# Patient Record
Sex: MALE | Race: WHITE | NOT HISPANIC OR LATINO | URBAN - METROPOLITAN AREA
[De-identification: names, ages, dates, MRNs, and addresses within clinical notes are randomized per-mention and may not be internally consistent; named-entity substitution may affect disease eponyms.]

---

## 2022-01-05 ENCOUNTER — APPOINTMENT (OUTPATIENT)
Dept: CT IMAGING | Facility: HOSPITAL | Age: 22
End: 2022-01-05

## 2022-01-05 ENCOUNTER — HOSPITAL ENCOUNTER (EMERGENCY)
Facility: HOSPITAL | Age: 22
Discharge: HOME | End: 2022-01-05
Admitting: EMERGENCY MEDICINE

## 2022-01-05 VITALS
BODY MASS INDEX: 18.24 KG/M2 | TEMPERATURE: 98 F | HEART RATE: 72 BPM | WEIGHT: 134.7 LBS | DIASTOLIC BLOOD PRESSURE: 65 MMHG | OXYGEN SATURATION: 100 % | RESPIRATION RATE: 16 BRPM | SYSTOLIC BLOOD PRESSURE: 112 MMHG | HEIGHT: 72 IN

## 2022-01-05 DIAGNOSIS — R07.9 CHEST PAIN, UNSPECIFIED TYPE: ICD-10-CM

## 2022-01-05 DIAGNOSIS — M54.9 ACUTE BACK PAIN, UNSPECIFIED BACK LOCATION, UNSPECIFIED BACK PAIN LATERALITY: ICD-10-CM
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
## 2022-01-05 DIAGNOSIS — R10.84 ABDOMINAL PAIN, GENERALIZED: ICD-10-CM

## 2022-01-05 DIAGNOSIS — R31.9 HEMATURIA, UNSPECIFIED TYPE: ICD-10-CM

## 2022-01-05 DIAGNOSIS — S16.1XXA STRAIN OF NECK MUSCLE, INITIAL ENCOUNTER: ICD-10-CM

## 2022-01-05 LAB
ALBUMIN SERPL-MCNC: 4.6 G/DL (ref 3.5–5.2)
ALBUMIN/GLOB SERPL: 1.8 G/DL
ALP SERPL-CCNC: 58 U/L (ref 39–117)
ALT SERPL W P-5'-P-CCNC: 13 U/L (ref 1–41)
ANION GAP SERPL CALCULATED.3IONS-SCNC: 12 MMOL/L (ref 5–15)
AST SERPL-CCNC: 19 U/L (ref 1–40)
BACTERIA UR QL AUTO: ABNORMAL /HPF
BASOPHILS # BLD AUTO: 0 10*3/MM3 (ref 0–0.2)
BASOPHILS NFR BLD AUTO: 0.6 % (ref 0–1.5)
BILIRUB SERPL-MCNC: 0.7 MG/DL (ref 0–1.2)
BILIRUB UR QL STRIP: NEGATIVE
BUN SERPL-MCNC: 8 MG/DL (ref 6–20)
BUN/CREAT SERPL: 9.4 (ref 7–25)
CALCIUM SPEC-SCNC: 9.6 MG/DL (ref 8.6–10.5)
CHLORIDE SERPL-SCNC: 102 MMOL/L (ref 98–107)
CLARITY UR: CLEAR
CO2 SERPL-SCNC: 26 MMOL/L (ref 22–29)
COLOR UR: YELLOW
CREAT SERPL-MCNC: 0.85 MG/DL (ref 0.76–1.27)
DEPRECATED RDW RBC AUTO: 39.4 FL (ref 37–54)
EOSINOPHIL # BLD AUTO: 0 10*3/MM3 (ref 0–0.4)
EOSINOPHIL NFR BLD AUTO: 0.2 % (ref 0.3–6.2)
ERYTHROCYTE [DISTWIDTH] IN BLOOD BY AUTOMATED COUNT: 12.8 % (ref 12.3–15.4)
GFR SERPL CREATININE-BSD FRML MDRD: 116 ML/MIN/1.73
GLOBULIN UR ELPH-MCNC: 2.6 GM/DL
GLUCOSE SERPL-MCNC: 106 MG/DL (ref 65–99)
GLUCOSE UR STRIP-MCNC: NEGATIVE MG/DL
HCT VFR BLD AUTO: 40.8 % (ref 37.5–51)
HGB BLD-MCNC: 14.4 G/DL (ref 13–17.7)
HGB UR QL STRIP.AUTO: ABNORMAL
HYALINE CASTS UR QL AUTO: ABNORMAL /LPF
KETONES UR QL STRIP: NEGATIVE
LEUKOCYTE ESTERASE UR QL STRIP.AUTO: NEGATIVE
LIPASE SERPL-CCNC: 36 U/L (ref 13–60)
LYMPHOCYTES # BLD AUTO: 1.3 10*3/MM3 (ref 0.7–3.1)
LYMPHOCYTES NFR BLD AUTO: 15.8 % (ref 19.6–45.3)
MCH RBC QN AUTO: 30.3 PG (ref 26.6–33)
MCHC RBC AUTO-ENTMCNC: 35.4 G/DL (ref 31.5–35.7)
MCV RBC AUTO: 85.6 FL (ref 79–97)
MONOCYTES # BLD AUTO: 0.5 10*3/MM3 (ref 0.1–0.9)
MONOCYTES NFR BLD AUTO: 5.6 % (ref 5–12)
NEUTROPHILS NFR BLD AUTO: 6.5 10*3/MM3 (ref 1.7–7)
NEUTROPHILS NFR BLD AUTO: 77.8 % (ref 42.7–76)
NITRITE UR QL STRIP: NEGATIVE
NRBC BLD AUTO-RTO: 0 /100 WBC (ref 0–0.2)
PH UR STRIP.AUTO: 6.5 [PH] (ref 5–8)
PLATELET # BLD AUTO: 327 10*3/MM3 (ref 140–450)
PMV BLD AUTO: 6.9 FL (ref 6–12)
POTASSIUM SERPL-SCNC: 3.5 MMOL/L (ref 3.5–5.2)
PROT SERPL-MCNC: 7.2 G/DL (ref 6–8.5)
PROT UR QL STRIP: NEGATIVE
RBC # BLD AUTO: 4.77 10*6/MM3 (ref 4.14–5.8)
RBC # UR STRIP: ABNORMAL /HPF
REF LAB TEST METHOD: ABNORMAL
SODIUM SERPL-SCNC: 140 MMOL/L (ref 136–145)
SP GR UR STRIP: 1.01 (ref 1–1.03)
SQUAMOUS #/AREA URNS HPF: ABNORMAL /HPF
UROBILINOGEN UR QL STRIP: ABNORMAL
WBC # UR STRIP: ABNORMAL /HPF
WBC NRBC COR # BLD: 8.4 10*3/MM3 (ref 3.4–10.8)

## 2022-01-05 PROCEDURE — 81001 URINALYSIS AUTO W/SCOPE: CPT | Performed by: NURSE PRACTITIONER

## 2022-01-05 PROCEDURE — 85025 COMPLETE CBC W/AUTO DIFF WBC: CPT | Performed by: NURSE PRACTITIONER

## 2022-01-05 PROCEDURE — 70450 CT HEAD/BRAIN W/O DYE: CPT

## 2022-01-05 PROCEDURE — 83690 ASSAY OF LIPASE: CPT | Performed by: NURSE PRACTITIONER

## 2022-01-05 PROCEDURE — 0 IOPAMIDOL PER 1 ML: Performed by: NURSE PRACTITIONER

## 2022-01-05 PROCEDURE — 80053 COMPREHEN METABOLIC PANEL: CPT | Performed by: NURSE PRACTITIONER

## 2022-01-05 PROCEDURE — 71260 CT THORAX DX C+: CPT

## 2022-01-05 PROCEDURE — 74177 CT ABD & PELVIS W/CONTRAST: CPT

## 2022-01-05 PROCEDURE — 72125 CT NECK SPINE W/O DYE: CPT

## 2022-01-05 PROCEDURE — 99283 EMERGENCY DEPT VISIT LOW MDM: CPT

## 2022-01-05 RX ORDER — SODIUM CHLORIDE 0.9 % (FLUSH) 0.9 %
10 SYRINGE (ML) INJECTION AS NEEDED
Status: DISCONTINUED | OUTPATIENT
Start: 2022-01-05 | End: 2022-01-06 | Stop reason: HOSPADM

## 2022-01-05 RX ORDER — METHOCARBAMOL 750 MG/1
750 TABLET, FILM COATED ORAL 3 TIMES DAILY PRN
Qty: 10 TABLET | Refills: 0 | Status: SHIPPED | OUTPATIENT
Start: 2022-01-05

## 2022-01-05 RX ADMIN — IOPAMIDOL 100 ML: 755 INJECTION, SOLUTION INTRAVENOUS at 21:11
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
## 2022-01-06 NOTE — DISCHARGE INSTRUCTIONS
Please follow-up with your primary care provider, call for appointment  Return the ED for new or worsening symptoms  If you do not have a primary care provider please utilize patient connection as
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
## 2024-01-09 ENCOUNTER — HOSPITAL ENCOUNTER (OUTPATIENT)
Facility: HOSPITAL | Age: 24
Discharge: HOME | End: 2024-01-09
Attending: EMERGENCY MEDICINE | Admitting: EMERGENCY MEDICINE
Payer: MEDICAID

## 2024-01-09 VITALS
HEART RATE: 106 BPM | SYSTOLIC BLOOD PRESSURE: 121 MMHG | TEMPERATURE: 99.9 F | OXYGEN SATURATION: 97 % | BODY MASS INDEX: 20.32 KG/M2 | RESPIRATION RATE: 18 BRPM | WEIGHT: 150 LBS | HEIGHT: 72 IN | DIASTOLIC BLOOD PRESSURE: 86 MMHG

## 2024-01-09 DIAGNOSIS — J10.1 INFLUENZA A: Primary | ICD-10-CM

## 2024-01-09 LAB
FLUAV SUBTYP SPEC NAA+PROBE: DETECTED
FLUBV RNA ISLT QL NAA+PROBE: NOT DETECTED
SARS-COV-2 RNA RESP QL NAA+PROBE: NOT DETECTED
STREP A PCR: NOT DETECTED

## 2024-01-09 PROCEDURE — 87651 STREP A DNA AMP PROBE: CPT

## 2024-01-09 PROCEDURE — G0463 HOSPITAL OUTPT CLINIC VISIT: HCPCS | Performed by: NURSE PRACTITIONER

## 2024-01-09 PROCEDURE — 87636 SARSCOV2 & INF A&B AMP PRB: CPT

## 2024-01-09 NOTE — FSED PROVIDER NOTE
EMERGENCY DEPARTMENT ENCOUNTER    Room Number:  HALB/B  Date seen:  2024  Time seen: 16:40 EST  PCP: Provider, No Known  Historian:     HPI:  Chief complaint: Cough, body aches, flulike symptoms  Context:Mando Gregory is a 23 y.o. male who presents to the ED with c/o cough, body aches, runny nose x 1 to 2 days.  Patient reports cough for 2 days, other symptoms started today.  Patient denies any nausea vomiting or diarrhea, difficulty breathing, chest pain or other concerns.    Timin days  Duration: Constant  Location: Body aches  Radiation: Nonradiating  Quality: Aches  Intensity/Severity: 2 out of 10 runny nose, congestion, fever  Associated Symptoms:, Body aches,runny nose, congestion, fever  Aggravating Factors: None  Alleviating Factors: None  Previous Episodes: None  Treatment before arrival: None    The patient was placed in a mask in triage, hand hygiene was performed before and after my interaction with the patient.  I wore a mask, safety glasses and gloves during my entire interaction with the patient.    MEDICAL RECORD REVIEW  Yes    ALLERGIES  Patient has no known allergies.    PAST MEDICAL HISTORY  Active Ambulatory Problems     Diagnosis Date Noted    No Active Ambulatory Problems     Resolved Ambulatory Problems     Diagnosis Date Noted    No Resolved Ambulatory Problems     No Additional Past Medical History       PAST SURGICAL HISTORY  No past surgical history on file.    FAMILY HISTORY  No family history on file.    SOCIAL HISTORY  Social History     Socioeconomic History    Marital status: Single   Tobacco Use    Smoking status: Never    Smokeless tobacco: Never   Substance and Sexual Activity    Alcohol use: Never    Drug use: Never    Sexual activity: Defer       REVIEW OF SYSTEMS  Review of Systems    All systems reviewed and negative except for those discussed in HPI.     PHYSICAL EXAM    I have reviewed the triage vital signs and nursing notes.    ED Triage Vitals [24 1556]   Temp  Heart Rate Resp BP SpO2   99.9 °F (37.7 °C) 106 18 121/86 97 %      Temp src Heart Rate Source Patient Position BP Location FiO2 (%)   Oral Monitor Sitting Left arm --       Physical Exam  Vitals and nursing note reviewed.   Constitutional:       General: He is not in acute distress.     Appearance: Normal appearance. He is not ill-appearing, toxic-appearing or diaphoretic.   HENT:      Head: Normocephalic and atraumatic.      Nose: Nose normal. No congestion or rhinorrhea.      Mouth/Throat:      Mouth: Mucous membranes are moist.      Pharynx: No oropharyngeal exudate or posterior oropharyngeal erythema.   Eyes:      Extraocular Movements: Extraocular movements intact.      Conjunctiva/sclera: Conjunctivae normal.      Pupils: Pupils are equal, round, and reactive to light.   Neck:      Vascular: No carotid bruit.   Cardiovascular:      Rate and Rhythm: Tachycardia present.      Pulses: Normal pulses.   Pulmonary:      Effort: Pulmonary effort is normal. No respiratory distress.      Breath sounds: Normal breath sounds. No stridor. No wheezing, rhonchi or rales.   Chest:      Chest wall: No tenderness.   Abdominal:      General: Bowel sounds are normal.      Palpations: Abdomen is soft.   Musculoskeletal:         General: Normal range of motion.      Cervical back: Normal range of motion and neck supple. No rigidity or tenderness.   Lymphadenopathy:      Cervical: No cervical adenopathy.   Skin:     General: Skin is warm and dry.      Capillary Refill: Capillary refill takes less than 2 seconds.   Neurological:      General: No focal deficit present.      Mental Status: He is alert and oriented to person, place, and time.   Psychiatric:         Mood and Affect: Mood normal.         Behavior: Behavior normal.         Thought Content: Thought content normal.         Judgment: Judgment normal.         Vital signs and nursing notes reviewed.        LAB RESULTS  Recent Results (from the past 24 hour(s))   Rapid Strep A  Screen - Swab, Throat    Collection Time: 01/09/24  3:58 PM    Specimen: Throat; Swab   Result Value Ref Range    STREP A PCR Not Detected Not Detected   COVID-19 and FLU A/B PCR, 1 HR TAT - Swab, Nasopharynx    Collection Time: 01/09/24  3:58 PM    Specimen: Nasopharynx; Swab   Result Value Ref Range    COVID19 Not Detected Not Detected - Ref. Range    Influenza A PCR Detected (A) Not Detected    Influenza B PCR Not Detected Not Detected       Ordered the above labs and independently reviewed the results.      RADIOLOGY RESULTS  No Radiology Exams Resulted Within Past 24 Hours       I ordered the above noted radiological studies. Independently reviewed by me and discussed with radiologist.  See dictation above for official radiology interpretation.      Orders placed during this visit:  Orders Placed This Encounter   Procedures    Rapid Strep A Screen - Swab, Throat    COVID-19 and FLU A/B PCR, 1 HR TAT - Swab, Nasopharynx              Medical Decision Making  COVID-negative, flu B-, flu a positive.  Patient symptomatic care instructions and return precautions.  Patient is agreeable plan of care, all questions answered at this time    Problems Addressed:  Influenza A: acute illness or injury        PROCEDURES    Procedures        MEDICATIONS GIVEN IN ER    Medications - No data to display      PROGRESS, DATA ANALYSIS, CONSULTS, AND MEDICAL DECISION MAKING    All labs have been independently reviewed by me.  All radiology studies have been reviewed by me.   EKG's independently reviewed by me.  Discussion below represents my analysis of pertinent findings related to patient's condition, differential diagnosis, treatment plan and final disposition.    DIFFERENTIAL DIAGNOSIS INCLUDE BUT NOT LIMITED TO: Viral illness, flu, COVID         AS OF 16:43 EST VITALS:    BP - 121/86  HR - 106  TEMP - 99.9 °F (37.7 °C) (Oral)  02 SATS - 97%    I rechecked the patient.  I discussed the patient's labs, radiology findings  (including all incidental findings), diagnosis, and plan for discharge.  A repeat exam reveals no new worrisome changes from my initial exam findings.  The patient understands that the fact that they are being discharged does not denote that nothing is abnormal, it indicates that no clinical emergency is present and that they must follow-up as directed in order to properly maintain their health.  Follow-up instructions (specifically listed below) and return to ER precautions were given at this time.  I specifically instructed the patient to follow-up with their PCP.  The patient understands and agrees with the plan, and is ready for discharge.  All questions answered.    Critical care:  Total critical care time of 0 minutes is exclusive of any other billable procedures and includes time spent with direct patient care and observation, retrospective chart review, management of acute condition, and consultation with other medical providers.    DIAGNOSIS  Final diagnoses:   Influenza A         DISPOSITION  Discharge home    Pt masked in first look. I wore a surgical mask throughout my encounters with the pt. I performed hand hygiene on entry into the pt room and upon exit.     Dictated utilizing Dragon dictation     Note Disclaimer: At [facility], we believe that sharing information builds trust and better relationships. You are receiving this note because you recently visited [facility]. It is possible you will see health information before a provider has talked with you about it. This kind of information can be easy to misunderstand. To help you fully understand what it means for your health, we urge you to discuss this note with your provider.